# Patient Record
Sex: FEMALE | URBAN - METROPOLITAN AREA
[De-identification: names, ages, dates, MRNs, and addresses within clinical notes are randomized per-mention and may not be internally consistent; named-entity substitution may affect disease eponyms.]

---

## 2020-08-27 ENCOUNTER — APPOINTMENT (RX ONLY)
Dept: URBAN - METROPOLITAN AREA CLINIC 73 | Facility: CLINIC | Age: 25
Setting detail: DERMATOLOGY
End: 2020-08-27

## 2020-08-27 DIAGNOSIS — L72.0 EPIDERMAL CYST: ICD-10-CM

## 2020-08-27 PROCEDURE — ? TREATMENT REGIMEN

## 2020-08-27 PROCEDURE — ? COUNSELING

## 2020-08-27 PROCEDURE — 99202 OFFICE O/P NEW SF 15 MIN: CPT

## 2020-08-27 PROCEDURE — ? OTHER

## 2020-08-27 ASSESSMENT — LOCATION ZONE DERM: LOCATION ZONE: TRUNK

## 2020-08-27 ASSESSMENT — LOCATION SIMPLE DESCRIPTION DERM: LOCATION SIMPLE: CHEST

## 2020-08-27 ASSESSMENT — LOCATION DETAILED DESCRIPTION DERM: LOCATION DETAILED: RIGHT MEDIAL SUPERIOR CHEST

## 2020-08-27 NOTE — PROCEDURE: OTHER
Other (Free Text): -Disc wouldn’t recommend I&D today.\\n-Disc would inject with ILK steroid\\n-disc oral antibiotics can help with inflammation and doesn’t help as fast\\n-Pt feels more comfortable with oral antibiotics than an injection\\n-disc oral antibiotics will take a few more weeks to see improvement \\n-Pt weighs 180lbs\\n-rtc in 1 week
Detail Level: Zone
Note Text (......Xxx Chief Complaint.): This diagnosis correlates with the

## 2020-08-27 NOTE — PROCEDURE: TREATMENT REGIMEN
Samples Given: Doryx 120mg x 3 boxes\\nCloderm x 1
Detail Level: Zone
Initiate Treatment: Doryx 120 take one tablet daily x 9 days\\nCloderm apply to affected areas on chest twice a day x 1 week

## 2020-08-27 NOTE — HPI: CYST
How Severe Is Your Cyst?: moderate
Is This A New Presentation, Or A Follow-Up?: Cyst
Additional History: Patient reports having a cyst on her chest and wants the cyst drained today. Pt reports never having a cyst in the past.